# Patient Record
Sex: FEMALE | Race: WHITE | Employment: FULL TIME | URBAN - METROPOLITAN AREA
[De-identification: names, ages, dates, MRNs, and addresses within clinical notes are randomized per-mention and may not be internally consistent; named-entity substitution may affect disease eponyms.]

---

## 2021-07-29 ENCOUNTER — OFFICE VISIT (OUTPATIENT)
Dept: NEUROLOGY | Age: 31
End: 2021-07-29
Payer: MEDICAID

## 2021-07-29 VITALS
OXYGEN SATURATION: 95 % | SYSTOLIC BLOOD PRESSURE: 112 MMHG | TEMPERATURE: 98 F | HEART RATE: 82 BPM | DIASTOLIC BLOOD PRESSURE: 60 MMHG | WEIGHT: 174 LBS

## 2021-07-29 DIAGNOSIS — M54.81 BILATERAL OCCIPITAL NEURALGIA: ICD-10-CM

## 2021-07-29 DIAGNOSIS — G44.86 CERVICOGENIC HEADACHE: ICD-10-CM

## 2021-07-29 DIAGNOSIS — E23.6 PITUITARY CYST (HCC): Primary | ICD-10-CM

## 2021-07-29 PROCEDURE — 99205 OFFICE O/P NEW HI 60 MIN: CPT | Performed by: PSYCHIATRY & NEUROLOGY

## 2021-07-29 RX ORDER — DULOXETIN HYDROCHLORIDE 60 MG/1
60 CAPSULE, DELAYED RELEASE ORAL DAILY
COMMUNITY

## 2021-07-29 RX ORDER — CYCLOBENZAPRINE HCL 10 MG
10 TABLET ORAL
COMMUNITY
End: 2021-10-19 | Stop reason: SDUPTHER

## 2021-07-29 RX ORDER — CLONIDINE HYDROCHLORIDE 0.2 MG/1
0.2 TABLET ORAL DAILY
COMMUNITY
End: 2022-05-03

## 2021-07-29 NOTE — LETTER
7/29/2021    Patient: Liu Rod   YOB: 1990   Date of Visit: 7/29/2021     MD Marcos [address]  [id number] [address] [place] [id number]  Via Fax: [phone]    Dear Jess Rodriguez MD,      Thank you for referring Ms. Josh Watters to [facility] for evaluation. My notes for this consultation are attached. If you have questions, please do not hesitate to call me. I look forward to following your patient along with you.       Sincerely,    Myra Patel MD

## 2021-07-29 NOTE — PATIENT INSTRUCTIONS
Neck: Exercises  Introduction  Here are some examples of exercises for you to try. The exercises may be suggested for a condition or for rehabilitation. Start each exercise slowly. Ease off the exercises if you start to have pain. You will be told when to start these exercises and which ones will work best for you. How to do the exercises  Neck stretch   1. This stretch works best if you keep your shoulder down as you lean away from it. To help you remember to do this, start by relaxing your shoulders and lightly holding on to your thighs or your chair. 2. Tilt your head toward your shoulder and hold for 15 to 30 seconds. Let the weight of your head stretch your muscles. 3. If you would like a little added stretch, use your hand to gently and steadily pull your head toward your shoulder. For example, keeping your right shoulder down, lean your head to the left. 4. Repeat 2 to 4 times toward each shoulder. Diagonal neck stretch   1. Turn your head slightly toward the direction you will be stretching, and tilt your head diagonally toward your chest and hold for 15 to 30 seconds. 2. If you would like a little added stretch, use your hand to gently and steadily pull your head forward on the diagonal.  3. Repeat 2 to 4 times toward each side. Dorsal glide stretch   The dorsal glide stretches the back of the neck. If you feel pain, do not glide so far back. Some people find this exercise easier to do while lying on their backs with an ice pack on the neck. 1. Sit or stand tall and look straight ahead. 2. Slowly tuck your chin as you glide your head backward over your body  3. Hold for a count of 6, and then relax for up to 10 seconds. 4. Repeat 8 to 12 times. Chest and shoulder stretch   1. Sit or stand tall and glide your head backward as in the dorsal glide stretch. 2. Raise both arms so that your hands are next to your ears.   3. Take a deep breath, and as you breathe out, lower your elbows down and behind your back. You will feel your shoulder blades slide down and together, and at the same time you will feel a stretch across your chest and the front of your shoulders. 4. Hold for about 6 seconds, and then relax for up to 10 seconds. 5. Repeat 8 to 12 times. Strengthening: Hands on head   1. Move your head backward, forward, and side to side against gentle pressure from your hands, holding each position for about 6 seconds. 2. Repeat 8 to 12 times. Follow-up care is a key part of your treatment and safety. Be sure to make and go to all appointments, and call your doctor if you are having problems. It's also a good idea to know your test results and keep a list of the medicines you take. Where can you learn more? Go to http://www.barrios.com/  Enter P975 in the search box to learn more about \"Neck: Exercises. \"  Current as of: November 16, 2020               Content Version: 12.8  © 2006-2021 [employer]. Care instructions adapted under license by [employer] (which disclaims liability or warranty for this information). If you have questions about a medical condition or this instruction, always ask your healthcare professional. [place] [address] any warranty or liability for your use of this information. Shoulder Blade: Exercises  Introduction  Here are some examples of exercises for you to try. The exercises may be suggested for a condition or for rehabilitation. Start each exercise slowly. Ease off the exercises if you start to have pain. You will be told when to start these exercises and which ones will work best for you. How to do the exercises  Shoulder roll   1. Stand tall with your chin slightly tucked. Imagine that a string at the top of your head is pulling you straight up. 2. Keep your arms relaxed. All motion will be in your shoulders. 3. Shrug your shoulders up toward your ears, then up and back.  [place] your shoulders down and back, like you're sliding your hands down into your back pants pockets. 4. Repeat the circles at least 2 to 4 times. 5. This exercise is also helpful anytime you want to relax. Lower neck and upper back stretch   1. With your arms about shoulder height, clasp your hands in front of you. 2. Drop your chin toward your chest.  3. Reach straight forward so you are rounding your upper back. Think about pulling your shoulder blades apart. [place] feel a stretch across your upper back and shoulders. Hold for at least 6 seconds. 4. Repeat 2 to 4 times. Triceps stretch   1. Reach your arm straight up. 2. Keeping your elbow in place, bend your arm and reach your hand down behind your back. 3. With your other hand, apply gentle pressure to the bent elbow. [place] feel a stretch at the back of your upper arm and shoulder. Hold about 6 seconds. 4. Repeat 2 to 4 times with each arm. Shoulder stretch   1. Relax your shoulders. 2. Raise one arm to shoulder height, and reach it across your chest.  3. Pull the arm slightly toward you with your other arm. This will help you get a gentle stretch. Hold for about 6 seconds. 4. Repeat 2 to 4 times. Shoulder blade squeeze   1. Sit or stand up tall with your arms at your sides. 2. Keep your shoulders relaxed and down, not shrugged. 3. Squeeze your shoulder blades together. Hold for 6 seconds, then relax. 4. Repeat 8 to 12 times. Straight-arm shoulder blade squeeze   1. Sit or stand tall. Relax your shoulders. 2. With palms down, hold your elastic tubing or band straight out in front of you. 3. Start with slight tension in the tubing or band, with your hands about shoulder-width apart. 4. Slowly pull straight out to the sides, squeezing your shoulder blades together. Keep your arms straight and at shoulder height. Slowly release. 5. Repeat 8 to 12 times. Rowing   1. [place] your elastic tubing or band at about waist height.  Take one end in each hand. 2. Sit or stand with your feet hip-width apart. 3. Hold your arms straight in front of you. Adjust your distance to create slight tension in the tubing or band. 4. Slightly tuck your chin. Relax your shoulders. 5. Without shrugging your shoulders, pull straight back. Your elbows will pass alongside your waist.    Pull-downs   1. [place] your elastic tubing or band in the top of a closed door. Take one end in each hand. 2. Either sit or stand, depending on what is more comfortable. If you feel unsteady, sit on a chair. 3. Start with your arms up and comfortably apart, elbows straight. There should be a slight tension in the tubing or band. 4. Slightly tuck your chin, and look straight ahead. 5. Keeping your back straight, slowly pull down and back, bending your elbows. 6. Stop where your hands are level with your chin, in a \"goalpost\" position. 7. Repeat 8 to 12 times. Chest T stretch   1. Lie on your back. Raise your knees so they are bent. Plant your feet on the floor, hip-width apart. 2. Tuck your chin, and relax your shoulders. 3. Reach your arms straight out to the sides. If you don't feel a mild stretch in your shoulders and across your chest, use a foam roll or a tightly rolled blanket under your spine, from your tailbone to your head. 4. Relax in this position for at least 15 to 30 seconds while you breathe normally. Repeat 2 to 4 times. 5. As you get used to this stretch, keep adding a little more time until you are able relax in this position for 2 or 3 minutes. When you can relax for at least 2 minutes, you only need to do the exercise 1 time per session. Chest goalpost stretch   1. Lie on your back. Raise your knees so they are bent. Plant your feet on the floor, hip-width apart. 2. Tuck your chin, and relax your shoulders. 3. Reach your arms straight out to the sides. 4. Bend your arms at the elbows, with your hands pointed toward the top of your head.  Your arms should make an L on either side of your head. Your palms should be facing up. 5. If you don't feel a mild stretch in your shoulders and across your chest, use a foam roll or tightly rolled blanket under your spine, from your tailbone to your head. 6. Relax in this position for at least 15 to 30 seconds while you breathe normally. Repeat 2 to 4 times. 7. Each day you do this exercise, add a little more time until you can relax in this position for 2 or 3 minutes. When you can relax for at least 2 minutes, you only need to do the exercise 1 time per session. Follow-up care is a key part of your treatment and safety. Be sure to make and go to all appointments, and call your doctor if you are having problems. It's also a good idea to know your test results and keep a list of the medicines you take. Where can you learn more? Go to http://www.gray.com/  Enter H745 in the search box to learn more about \"Shoulder Blade: Exercises. \"  Current as of: November 16, 2020               Content Version: 12.8  © 2006-2021 [employer]. Care instructions adapted under license by [employer] (which disclaims liability or warranty for this information). If you have questions about a medical condition or this instruction, always ask your healthcare professional. [address] any warranty or liability for your use of this information.

## 2021-07-29 NOTE — PROGRESS NOTES
NEUROLOGY CLINIC NOTE    Patient ID:  Abundio Hoffman  [id number]  45 y.o.  1990    Date of Consultation:  July 29, 2021    Referring Physician: Dr. Raven Humphrey    Reason for Consultation:  Headaches    Chief Complaint   Patient presents with    Other     Cyst on brain seen after MRI    Headache     Temporal headaches at least once weekly        History of Present Illness: There are no problems to display for this patient. Past Medical History:   Diagnosis Date    Anxiety     Asthma     Depression     PTSD (post-traumatic stress disorder)      Past Surgical History:   Procedure Laterality Date    HX TUBAL LIGATION           Prior to Admission medications    Medication Sig Start Date End Date Taking? Authorizing Provider   cloNIDine HCL (CATAPRES) 0.2 mg tablet Take 0.2 mg by mouth daily. Yes Provider, Historical   DULoxetine (Cymbalta) 60 mg capsule Take 60 mg by mouth daily. Yes Provider, Historical   aspirin-acetaminophen-caffeine (EXCEDRIN ES) 250-250-65 mg per tablet Take 1 Tablet by mouth. Yes Provider, Historical   cyclobenzaprine (FLEXERIL) 10 mg tablet Take 10 mg by mouth three (3) times daily as needed for Muscle Spasm(s). Yes Provider, Historical     Social History     Tobacco Use    Smoking status: Never Smoker    Smokeless tobacco: Current User   Vaping Use    Vaping Use: Never assessed   Substance Use Topics    Alcohol use: Yes     Alcohol/week: 2.0 standard drinks     Types: 2 Glasses of wine per week    Drug use: Never     Family History   Problem Relation Age of Onset    COPD Mother     Arthritis Mother          Subjective:      Abundio Hoffman is a 27 y.o. RHWF anxiety, depression and PTSD who was referred here by Dr. Raven Humphrey for further evaluation of MRI findings of a brain cyst and headaches.     Headache for the past couple months  1 to 3 weeks with a headache and then another 3 weeks without  Sudden onset  Bitemporal and radiates to the back of the head  Throbbing  7-8/10 at its worst.  Associated with light sensitivity. Lasts for 1 hour. Excedrin sometimes it helps  No known precipitant. No worsening factors. Rest offers benefit. Last headache was this morning. Stay at home. Review of available records revealed:  Patient was seen by Dr. Kary Díaz, endocrinologist for hyperprolactinemia. Note mentions patient having issues with menorrhagia and galactorrhea. Prolactin level was noted to be elevated at 90 with normal TSH. Pregnancy test was negative. Menorrhagia since 10/20/2020. Also noted several months of galactorrhea. Patient was on risperidone 0.5 mg every day for 2 years for depression, anxiety and insomnia. Being followed by Dr. Bruce Quinn (psychiatrist). Labs done 6/2/2021 revealed elevated prolactin at 90 with normal LH, FSH, TSH and T4. Impression then was possibly risperidone related side effects versus problems with pituitary function. Advised to discuss with psychiatrist in changing risperidone to alternative medication and a brain MRI focusing on the pituitary was ordered. Additional laboratory work-up was ordered. Labs done 6/24/2021 revealed normal FSH, LH, CMP, TSH, free T4, cortisol, estradiol, IGF and elevated prolactin at 85.7. Brain MRI with and without contrast of the pituitary done 6/24/2021 on postcontrast study reveals a bandlike signal of hypoenhancement along the posterior aspect of the pituitary gland. 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 Discrete focus of hypoenhancement in the rightward aspect of the pituitary gland communicates with a pars intermedia and may represent a focal dilation of pars intermedius/pars intermedia cyst, less likely adenoma. No secondary features of mass-effect. Mild cerebellar tonsillar ectopia approximately 4 mm. There is no abnormal brain parenchymal or leptomeningeal enhancement.   Impression finding more related to pars intermedia/small cystic dilation and less likely a pituitary adenoma. Otherwise normal exam.    Patient was seen on follow-up 7/10/2021. Risperidone was discontinued by the patient for the past 2 weeks and she has lost 4 pounds since last appointment. Patient was referred to Dr. Duc Melgoza neurosurgery for pituitary lesion/cyst.  Repeat prolactin level testing was ordered. Per patient that has since improved. [facility] of [facility] revealed patient was seen by her primary care physician 5/12/2021. Patient was complaining of low blood pressure, menorrhagia and galactorrhea. Advised to monitor blood pressure at home. Complaining of neck and back pain and x-rays were ordered. Continue as needed albuterol for asthma. Continue medication for anxiety and PTSD. Laboratory work-up was ordered. And referred to OB. Labs done revealed unremarkable CMP, negative pregnancy screen, unremarkable UA, CBC, estradiol, LH, hemoglobin A1c, TSH and FSH. Elevated prolactin level at 64. Lipid panel revealed increased triglycerides. Outside reports reviewed: office notes, radiology reports, lab reports.     Review of Systems:    A comprehensive review of systems was performed:   Constitutional: positive for none  Eyes: positive for none  Ears, nose, mouth, throat, and face: positive for none  Respiratory: positive for none  Cardiovascular: positive for none  Gastrointestinal: positive for constipation   Genitourinary: positive for none  Integument/breast: positive for none  Hematologic/lymphatic: positive for none  Musculoskeletal: positive for none  Neurological: positive for headaches  Behavioral/Psych: positive for anxiety  Endocrine: positive for none  Allergic/Immunologic: positive for none      Objective:     Visit Vitals  /60 (BP 1 Location: Right arm, BP Patient Position: Sitting, BP Cuff Size: Large adult)   Pulse 82   Temp 98 °F (36.7 °C) (Temporal)   Wt 78.9 kg (174 lb)   SpO2 95%       PHYSICAL EXAM:    General Appearance: Alert, patient appears stated age. General:  Well developed, well nourished, patient in no apparent distress. Head/Face: The head is normocephalic and atraumatic. Eyes: Conjunctivae appear normal. Sclera appear normal and non-icteric. Nose (and Sinus):   No abnormality of the nose or sinuses is noted. Oral:   Throat is clear. Lymphatics:  No lymphadenopathy in the neck/head. Neck and Thyroid:   No bruits noted in the neck. Respiratory:  Lungs clear to auscultation. Cardiovascular:  Palpation and auscultation: regular rate and rhythm. Extremity: No joint swelling, erythema or pedal edema. NEUROLOGICAL EXAM:    Appearance: The patient is well developed, well nourished, provides a coherent history and is in no acute distress. Mental Status: Oriented to time, place and person. Fluent, no aphasia or dysarthria. Mood and affect appropriate. Cranial Nerves:   Intact visual fields. VA 20/20 OU on near vision without correction. Fundi are benign. ERICA, EOM's full, no nystagmus, no ptosis. Facial sensation is normal. Corneal reflexes are intact. Facial movement is symmetric. Hearing is normal bilaterally. Palate is midline with normal elevation. Sternocleidomastoid and trapezius muscles are normal. Tongue is midline. Motor:  5/5 strength in upper and lower proximal and distal muscles. Normal bulk and tone. No fasciculations. No pronator drift. Reflexes:   Deep tendon reflexes 1+/4 and symmetrical. Downgoing toes. Sensory:   Normal to cold, pinprick and vibration. Gait:  Normal gait. No Romberg. Can do tandem walking. Tremor:   No tremor noted. Cerebellar:  Intact FTN/NALDO/HTS. Neurovascular:  Normal heart sounds and regular rhythm, peripheral pulses intact, and no carotid bruits.      Anterior head posture with shoulders rotated in  (+) tenderness bilateral occiput    Imaging  Brain MRI: reviewed    Labs Reviewed      Assessment:   Pituitary cyst  Cervicogenic headaches  Occipital neuralgia    Plan: Neurological examination is nonfocal. Brain MRI with and without contrast of the pituitary done 6/24/2021 on postcontrast study reveals a bandlike signal of hypoenhancement along the posterior aspect of the pituitary gland. 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 Discrete focus of hypoenhancement in the rightward aspect of the pituitary gland communicates with a pars intermedia and may represent a focal dilation of pars intermedius/pars intermedia cyst, less likely adenoma. No secondary features of mass-effect. Mild cerebellar tonsillar ectopia approximately 4 mm. There is no abnormal brain parenchymal or leptomeningeal enhancement. Impression finding more related to pars intermedia/small cystic dilation and less likely a pituitary adenoma. Otherwise normal exam.  Pituitary laboratory work-up was unremarkable and prolactin levels back to normal reportedly. Findings on brain MRI is more coincidental.  No further work-up from a neurological standpoint. No indication currently to see neurosurgery. Certainly if laboratory future work-up becomes abnormal then a repeat brain MRI can be done for comparison. History exam reveals elements of cervicogenic headaches due to poor anterior head posture. Patient was advised to correct her anterior head posture. Use headrest at home and while driving. Use wireless headsets. Use only 1 pillow at most when sleeping. Do not carry anything on the shoulder. Patient was given brochure for neck and shoulder exercises to do. Okay to take OTC medication for abortive therapy. If condition persist patient may benefit from referral to physical therapy for more aggressive manipulation and dry needling. Potential trial of muscle relaxants. Exam also reveals elements of occipital neuralgia due to poor anterior head posture. Correction of head posture as above.   Potential trial of medications for neuralgia if persist.  Potential occipital nerve blocks if condition worsens. All questions and concerns were answered. Visit lasted 70 minutes. Greater than 50% was spent reviewing her medical records as summarized above including neuroimaging, discussion about her condition, etiology, prognosis, benign nature of the pituitary cyst, posture changes, neck and shoulder exercises, treatment options, medication    This note was created using voice recognition software. Despite editing, there may be syntax errors.

## 2021-08-18 ENCOUNTER — TELEPHONE (OUTPATIENT)
Dept: NEUROLOGY | Age: 31
End: 2021-08-18

## 2021-08-18 NOTE — TELEPHONE ENCOUNTER
----- Message from Candi Ziegler sent at 8/18/2021  9:30 AM EDT -----  Regarding: /telephone  General Message/Vendor Calls    Caller's first and last name:      Reason for call: The pt would like to know if her MRI images of her cyst were normal.      Callback required yes/no and why:Yes.       Best contact number(s):[phone]

## 2021-10-19 ENCOUNTER — OFFICE VISIT (OUTPATIENT)
Dept: NEUROLOGY | Age: 31
End: 2021-10-19
Payer: MEDICAID

## 2021-10-19 VITALS — RESPIRATION RATE: 20 BRPM

## 2021-10-19 DIAGNOSIS — E23.6 PITUITARY CYST (HCC): ICD-10-CM

## 2021-10-19 DIAGNOSIS — G44.86 CERVICOGENIC HEADACHE: Primary | ICD-10-CM

## 2021-10-19 DIAGNOSIS — M54.81 BILATERAL OCCIPITAL NEURALGIA: ICD-10-CM

## 2021-10-19 PROCEDURE — 99214 OFFICE O/P EST MOD 30 MIN: CPT | Performed by: PSYCHIATRY & NEUROLOGY

## 2021-10-19 RX ORDER — ARIPIPRAZOLE 2 MG/1
TABLET ORAL
COMMUNITY
Start: 2021-09-24

## 2021-10-19 RX ORDER — CYCLOBENZAPRINE HCL 10 MG
TABLET ORAL
Qty: 60 TABLET | Refills: 2 | Status: SHIPPED | OUTPATIENT
Start: 2021-10-19

## 2021-10-19 NOTE — PROGRESS NOTES
NEUROLOGY CLINIC NOTE    Patient ID:  Narciso Levy  [id number]  33 y.o.  1990    Date of Visit:  October 19, 2021    Referring Physician: Dr. Cecil Ferguson    Reason for Visit:  Headaches    Chief Complaint   Patient presents with    Follow-up     headaches, pituitary cyst        History of Present Illness: There are no problems to display for this patient. Past Medical History:   Diagnosis Date    Anxiety     Asthma     Depression     PTSD (post-traumatic stress disorder)      Past Surgical History:   Procedure Laterality Date    HX TUBAL LIGATION           Prior to Admission medications    Medication Sig Start Date End Date Taking? Authorizing Provider   ARIPiprazole (ABILIFY) 2 mg tablet  9/24/21  Yes Provider, Historical   cloNIDine HCL (CATAPRES) 0.2 mg tablet Take 0.2 mg by mouth daily. Yes Provider, Historical   DULoxetine (Cymbalta) 60 mg capsule Take 60 mg by mouth daily. Yes Provider, Historical   aspirin-acetaminophen-caffeine (EXCEDRIN ES) 250-250-65 mg per tablet Take 1 Tablet by mouth. Yes Provider, Historical   cyclobenzaprine (FLEXERIL) 10 mg tablet Take 10 mg by mouth three (3) times daily as needed for Muscle Spasm(s). Yes Provider, Historical     Social History     Tobacco Use    Smoking status: Never Smoker    Smokeless tobacco: Current User   Vaping Use    Vaping Use: Never assessed   Substance Use Topics    Alcohol use: Yes     Alcohol/week: 2.0 standard drinks     Types: 2 Glasses of wine per week    Drug use: Never     Family History   Problem Relation Age of Onset    COPD Mother     Arthritis Mother          Subjective:      Narciso Levy is a 32 y.o. RHWF anxiety, depression and PTSD who was referred here by Dr. Cecil Ferguson for further evaluation of MRI findings of a brain cyst and headaches. Patient is here for follow up.     Headache for the past couple months  1 to 3 weeks with a headache and then another 3 weeks without  Sudden onset  Bitemporal and radiates to the back of the head  Throbbing  7-8/10 at its worst.  Associated with light sensitivity. Lasts for 1 hour. Excedrin sometimes it helps  No known precipitant. No worsening factors. Rest offers benefit. Last headache was this morning. Stay at home. Review of available records revealed:  Patient was seen by Dr. Riccardo Burden, endocrinologist for hyperprolactinemia. Note mentions patient having issues with menorrhagia and galactorrhea. Prolactin level was noted to be elevated at 90 with normal TSH. Pregnancy test was negative. Menorrhagia since 10/20/2020. Also noted several months of galactorrhea. Patient was on risperidone 0.5 mg every day for 2 years for depression, anxiety and insomnia. Being followed by Dr. Delfin Bloom (psychiatrist). Labs done 6/2/2021 revealed elevated prolactin at 90 with normal LH, FSH, TSH and T4. Impression then was possibly risperidone related side effects versus problems with pituitary function. Advised to discuss with psychiatrist in changing risperidone to alternative medication and a brain MRI focusing on the pituitary was ordered. Additional laboratory work-up was ordered. Labs done 6/24/2021 revealed normal FSH, LH, CMP, TSH, free T4, cortisol, estradiol, IGF and elevated prolactin at 85.7. Brain MRI with and without contrast of the pituitary done 6/24/2021 on postcontrast study reveals a bandlike signal of hypoenhancement along the posterior aspect of the pituitary gland. 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 Discrete focus of hypoenhancement in the rightward aspect of the pituitary gland communicates with a pars intermedia and may represent a focal dilation of pars intermedius/pars intermedia cyst, less likely adenoma. No secondary features of mass-effect. Mild cerebellar tonsillar ectopia approximately 4 mm. There is no abnormal brain parenchymal or leptomeningeal enhancement.   Impression finding more related to pars intermedia/small cystic dilation and less likely a pituitary adenoma. Otherwise normal exam.    Patient was seen on follow-up 7/10/2021. Risperidone was discontinued by the patient for the past 2 weeks and she has lost 4 pounds since last appointment. Patient was referred to Dr. Aaliyah Manning neurosurgery for pituitary lesion/cyst.  Repeat prolactin level testing was ordered. Per patient that has since improved. Review of records from [address] [place] [address] revealed patient was seen by her primary care physician 5/12/2021. Patient was complaining of low blood pressure, menorrhagia and galactorrhea. Advised to monitor blood pressure at home. Complaining of neck and back pain and x-rays were ordered. Continue as needed albuterol for asthma. Continue medication for anxiety and PTSD. Laboratory work-up was ordered. And referred to OB. Labs done revealed unremarkable CMP, negative pregnancy screen, unremarkable UA, CBC, estradiol, LH, hemoglobin A1c, TSH and FSH. Elevated prolactin level at 64. Lipid panel revealed increased triglycerides. Since the last visit on 7/29/2021, patient reports headaches have improved. Less frequent. 1-3 headaches per week 8/10 in intensity. Excedrin helps better than tylenol or ibuprofen. Last bad headache was today. Bitemporal, behind the eyes and occiput in location.     Outside reports reviewed: none    Review of Systems:    A comprehensive review of systems was performed:   Constitutional: positive for none  Eyes: positive for none  Ears, nose, mouth, throat, and face: positive for none  Respiratory: positive for none  Cardiovascular: positive for none  Gastrointestinal: positive for constipation   Genitourinary: positive for none  Integument/breast: positive for none  Hematologic/lymphatic: positive for none  Musculoskeletal: positive for none  Neurological: positive for headaches  Behavioral/Psych: positive for anxiety  Endocrine: positive for none  Allergic/Immunologic: positive for none      Objective:     Visit Vitals  Resp 20       PHYSICAL EXAM:    NEUROLOGICAL EXAM:    Appearance: The patient is well developed, well nourished, provides a coherent history and is in no acute distress. Mental Status: Oriented to time, place and person. Fluent, no aphasia or dysarthria. Mood and affect appropriate. Cranial Nerves:   II - XII were intact. Motor:  5/5 strength in upper and lower proximal and distal muscles. Normal bulk and tone. No pronator drift. Reflexes:   Deep tendon reflexes were symmetrical.    Sensory:   Normal to cold, pinprick and vibration. Gait:  Normal gait. No Romberg. Tremor:   No tremor noted. Cerebellar:  Intact FTN/NALDO/HTS. Anterior head posture with shoulders rotated in      Assessment:   Pituitary cyst  Cervicogenic headaches  Occipital neuralgia    Plan:   Neurological examination is nonfocal. Brain MRI with and without contrast of the pituitary done 6/24/2021 on postcontrast study reveals a bandlike signal of hypoenhancement along the posterior aspect of the pituitary gland. 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 Discrete focus of hypoenhancement in the rightward aspect of the pituitary gland communicates with a pars intermedia and may represent a focal dilation of pars intermedius/pars intermedia cyst, less likely adenoma. No secondary features of mass-effect. Mild cerebellar tonsillar ectopia approximately 4 mm. There is no abnormal brain parenchymal or leptomeningeal enhancement. Impression finding more related to pars intermedia/small cystic dilation and less likely a pituitary adenoma. Otherwise normal exam.  Pituitary laboratory work-up was unremarkable and prolactin levels back to normal reportedly. Findings on brain MRI is more coincidental.  No further work-up from a neurological standpoint. No indication currently to see neurosurgery.   Certainly if laboratory future work-up becomes abnormal then a repeat brain MRI can be done for comparison. History exam reveals elements of cervicogenic headaches due to poor anterior head posture. Patient was encouraged to continue to correct her anterior head posture. Use headrest at home and while driving. Use wireless headsets. Use only 1 pillow at most when sleeping. Do not carry anything on the shoulder. Continue neck and shoulder exercises. Okay to take OTC medication for abortive therapy. If condition persist patient may benefit from referral to physical therapy for more aggressive manipulation and dry needling. Trial of Cyclobenzaprine 10 mg daily and BID if necessary. Prescription was provided. Exam also reveals elements of occipital neuralgia due to poor anterior head posture. Correction of head posture as above. Potential trial of medications for neuralgia if persist.  Potential occipital nerve blocks if condition worsens. All questions and concerns were answered. This note was created using voice recognition software. Despite editing, there may be syntax errors.

## 2021-10-19 NOTE — LETTER
10/22/2021    Patient: Anita Elkins   YOB: 1990   Date of Visit: 10/19/2021     [address]  [id number] [phone]  Via Fax: [phone]    Dear Deborah Scruggs MD,      Thank you for referring Ms. Mahamed Camarillo to [facility] for evaluation. My notes for this consultation are attached. If you have questions, please do not hesitate to call me. I look forward to following your patient along with you.       Sincerely,    Fred Kaba MD

## 2022-01-22 ENCOUNTER — HOSPITAL ENCOUNTER (EMERGENCY)
Age: 32
Discharge: HOME | End: 2022-01-22
Payer: COMMERCIAL

## 2022-01-22 VITALS
TEMPERATURE: 97.8 F | OXYGEN SATURATION: 98 % | WEIGHT: 150 LBS | HEIGHT: 67 IN | SYSTOLIC BLOOD PRESSURE: 100 MMHG | BODY MASS INDEX: 23.54 KG/M2 | RESPIRATION RATE: 18 BRPM | HEART RATE: 72 BPM | DIASTOLIC BLOOD PRESSURE: 45 MMHG

## 2022-01-22 DIAGNOSIS — D64.9 ANEMIA, UNSPECIFIED TYPE: ICD-10-CM

## 2022-01-22 DIAGNOSIS — N93.8 DUB (DYSFUNCTIONAL UTERINE BLEEDING): Primary | ICD-10-CM

## 2022-01-22 LAB
ABO + RH BLD: NORMAL
ALBUMIN SERPL-MCNC: 2 G/DL (ref 3.5–5)
ALBUMIN SERPL-MCNC: 3.2 G/DL (ref 3.5–5)
ALBUMIN/GLOB SERPL: 0.8 {RATIO} (ref 1.1–2.2)
ALBUMIN/GLOB SERPL: 1 {RATIO} (ref 1.1–2.2)
ALP SERPL-CCNC: 29 U/L (ref 45–117)
ALP SERPL-CCNC: 44 U/L (ref 45–117)
ALT SERPL-CCNC: 15 U/L (ref 12–78)
ALT SERPL-CCNC: 22 U/L (ref 12–78)
ANION GAP SERPL CALC-SCNC: 1 MMOL/L (ref 5–15)
ANION GAP SERPL CALC-SCNC: 3 MMOL/L (ref 5–15)
AST SERPL W P-5'-P-CCNC: 16 U/L (ref 15–37)
AST SERPL W P-5'-P-CCNC: 17 U/L (ref 15–37)
BASOPHILS # BLD: 0.1 K/UL (ref 0–0.1)
BASOPHILS NFR BLD: 1 % (ref 0–1)
BILIRUB SERPL-MCNC: 0.2 MG/DL (ref 0.2–1)
BILIRUB SERPL-MCNC: 0.3 MG/DL (ref 0.2–1)
BLOOD GROUP ANTIBODIES SERPL: NEGATIVE
BUN SERPL-MCNC: 11 MG/DL (ref 6–20)
BUN SERPL-MCNC: 8 MG/DL (ref 6–20)
BUN/CREAT SERPL: 14 (ref 12–20)
BUN/CREAT SERPL: 19 (ref 12–20)
CA-I BLD-MCNC: 5.9 MG/DL (ref 8.5–10.1)
CA-I BLD-MCNC: 8.4 MG/DL (ref 8.5–10.1)
CHLORIDE SERPL-SCNC: 112 MMOL/L (ref 97–108)
CHLORIDE SERPL-SCNC: 121 MMOL/L (ref 97–108)
CO2 SERPL-SCNC: 20 MMOL/L (ref 21–32)
CO2 SERPL-SCNC: 28 MMOL/L (ref 21–32)
CREAT SERPL-MCNC: 0.42 MG/DL (ref 0.55–1.02)
CREAT SERPL-MCNC: 0.77 MG/DL (ref 0.55–1.02)
DIFFERENTIAL METHOD BLD: ABNORMAL
EOSINOPHIL # BLD: 0.2 K/UL (ref 0–0.4)
EOSINOPHIL NFR BLD: 3 % (ref 0–7)
ERYTHROCYTE [DISTWIDTH] IN BLOOD BY AUTOMATED COUNT: 13.3 % (ref 11.5–14.5)
GLOBULIN SER CALC-MCNC: 2.5 G/DL (ref 2–4)
GLOBULIN SER CALC-MCNC: 3.3 G/DL (ref 2–4)
GLUCOSE SERPL-MCNC: 66 MG/DL (ref 65–100)
GLUCOSE SERPL-MCNC: 92 MG/DL (ref 65–100)
HCG SERPL QL: NEGATIVE
HCT VFR BLD AUTO: 33.6 % (ref 35–47)
HGB BLD-MCNC: 10.7 G/DL (ref 11.5–16)
IMM GRANULOCYTES # BLD AUTO: 0 K/UL (ref 0–0.04)
IMM GRANULOCYTES NFR BLD AUTO: 0 % (ref 0–0.5)
LYMPHOCYTES # BLD: 1.5 K/UL (ref 0.8–3.5)
LYMPHOCYTES NFR BLD: 21 % (ref 12–49)
MCH RBC QN AUTO: 31.2 PG (ref 26–34)
MCHC RBC AUTO-ENTMCNC: 31.8 G/DL (ref 30–36.5)
MCV RBC AUTO: 98 FL (ref 80–99)
MONOCYTES # BLD: 0.5 K/UL (ref 0–1)
MONOCYTES NFR BLD: 7 % (ref 5–13)
NEUTS SEG # BLD: 4.8 K/UL (ref 1.8–8)
NEUTS SEG NFR BLD: 68 % (ref 32–75)
NRBC # BLD: 0 K/UL (ref 0–0.01)
NRBC BLD-RTO: 0 PER 100 WBC
PLATELET # BLD AUTO: 224 K/UL (ref 150–400)
PMV BLD AUTO: 11.4 FL (ref 8.9–12.9)
POTASSIUM SERPL-SCNC: 3.7 MMOL/L (ref 3.5–5.1)
POTASSIUM SERPL-SCNC: 4 MMOL/L (ref 3.5–5.1)
PROT SERPL-MCNC: 4.5 G/DL (ref 6.4–8.2)
PROT SERPL-MCNC: 6.5 G/DL (ref 6.4–8.2)
RBC # BLD AUTO: 3.43 M/UL (ref 3.8–5.2)
SODIUM SERPL-SCNC: 141 MMOL/L (ref 136–145)
SODIUM SERPL-SCNC: 144 MMOL/L (ref 136–145)
SPECIMEN EXP DATE BLD: NORMAL
WBC # BLD AUTO: 7 K/UL (ref 3.6–11)

## 2022-01-22 PROCEDURE — 86900 BLOOD TYPING SEROLOGIC ABO: CPT

## 2022-01-22 PROCEDURE — 80053 COMPREHEN METABOLIC PANEL: CPT

## 2022-01-22 PROCEDURE — 85025 COMPLETE CBC W/AUTO DIFF WBC: CPT

## 2022-01-22 PROCEDURE — 84703 CHORIONIC GONADOTROPIN ASSAY: CPT

## 2022-01-22 PROCEDURE — 99283 EMERGENCY DEPT VISIT LOW MDM: CPT

## 2022-01-22 PROCEDURE — 36415 COLL VENOUS BLD VENIPUNCTURE: CPT

## 2022-01-22 NOTE — DISCHARGE INSTRUCTIONS
Thank you! Thank you for allowing me to care for you in the emergency department. I sincerely hope that you are satisfied with your visit today. It is my goal to provide you with excellent care. Below you will find a list of your labs and imaging from your visit today. Should you have any questions regarding these results please do not hesitate to call the emergency department. Labs -     Recent Results (from the past 12 hour(s))   CBC WITH AUTOMATED DIFF    Collection Time: 01/22/22 11:26 AM   Result Value Ref Range    WBC 7.0 3.6 - 11.0 K/uL    RBC 3.43 (L) 3.80 - 5.20 M/uL    HGB 10.7 (L) 11.5 - 16.0 g/dL    HCT 33.6 (L) 35.0 - 47.0 %    MCV 98.0 80.0 - 99.0 FL    MCH 31.2 26.0 - 34.0 PG    MCHC 31.8 30.0 - 36.5 g/dL    RDW 13.3 11.5 - 14.5 %    PLATELET 338 516 - 933 K/uL    MPV 11.4 8.9 - 12.9 FL    NRBC 0.0 0.0  WBC    ABSOLUTE NRBC 0.00 0.00 - 0.01 K/uL    NEUTROPHILS 68 32 - 75 %    LYMPHOCYTES 21 12 - 49 %    MONOCYTES 7 5 - 13 %    EOSINOPHILS 3 0 - 7 %    BASOPHILS 1 0 - 1 %    IMMATURE GRANULOCYTES 0 0 - 0.5 %    ABS. NEUTROPHILS 4.8 1.8 - 8.0 K/UL    ABS. LYMPHOCYTES 1.5 0.8 - 3.5 K/UL    ABS. MONOCYTES 0.5 0.0 - 1.0 K/UL    ABS. EOSINOPHILS 0.2 0.0 - 0.4 K/UL    ABS. BASOPHILS 0.1 0.0 - 0.1 K/UL    ABS. IMM.  GRANS. 0.0 0.00 - 0.04 K/UL    DF AUTOMATED     TYPE & SCREEN    Collection Time: 01/22/22 11:26 AM   Result Value Ref Range    Crossmatch Expiration 01/25/2022,2353     ABO/Rh(D) A Positive     Antibody screen Negative    METABOLIC PANEL, COMPREHENSIVE    Collection Time: 01/22/22 11:26 AM   Result Value Ref Range    Sodium 144 136 - 145 mmol/L    Potassium 3.7 3.5 - 5.1 mmol/L    Chloride 121 (H) 97 - 108 mmol/L    CO2 20 (L) 21 - 32 mmol/L    Anion gap 3 (L) 5 - 15 mmol/L    Glucose 66 65 - 100 mg/dL    BUN 8 6 - 20 mg/dL    Creatinine 0.42 (L) 0.55 - 1.02 mg/dL    BUN/Creatinine ratio 19 12 - 20      GFR est AA >60 >60 ml/min/1.73m2    GFR est non-AA >60 >60 ml/min/1.73m2 Calcium 5.9 (LL) 8.5 - 10.1 mg/dL    Bilirubin, total 0.2 0.2 - 1.0 mg/dL    AST (SGOT) 17 15 - 37 U/L    ALT (SGPT) 15 12 - 78 U/L    Alk. phosphatase 29 (L) 45 - 117 U/L    Protein, total 4.5 (L) 6.4 - 8.2 g/dL    Albumin 2.0 (L) 3.5 - 5.0 g/dL    Globulin 2.5 2.0 - 4.0 g/dL    A-G Ratio 0.8 (L) 1.1 - 2.2     HCG QL SERUM    Collection Time: 01/22/22 11:26 AM   Result Value Ref Range    HCG, Ql. Negative Negative     METABOLIC PANEL, COMPREHENSIVE    Collection Time: 01/22/22  1:28 PM   Result Value Ref Range    Sodium 141 136 - 145 mmol/L    Potassium 4.0 3.5 - 5.1 mmol/L    Chloride 112 (H) 97 - 108 mmol/L    CO2 28 21 - 32 mmol/L    Anion gap 1 (L) 5 - 15 mmol/L    Glucose 92 65 - 100 mg/dL    BUN 11 6 - 20 mg/dL    Creatinine 0.77 0.55 - 1.02 mg/dL    BUN/Creatinine ratio 14 12 - 20      GFR est AA >60 >60 ml/min/1.73m2    GFR est non-AA >60 >60 ml/min/1.73m2    Calcium 8.4 (L) 8.5 - 10.1 mg/dL    Bilirubin, total 0.3 0.2 - 1.0 mg/dL    AST (SGOT) 16 15 - 37 U/L    ALT (SGPT) 22 12 - 78 U/L    Alk. phosphatase 44 (L) 45 - 117 U/L    Protein, total 6.5 6.4 - 8.2 g/dL    Albumin 3.2 (L) 3.5 - 5.0 g/dL    Globulin 3.3 2.0 - 4.0 g/dL    A-G Ratio 1.0 (L) 1.1 - 2.2         Radiologic Studies -   No orders to display     CT Results  (Last 48 hours)      None          CXR Results  (Last 48 hours)      None               If you feel that you have not received excellent quality care or timely care, please ask to speak to the nurse manager. Please choose us in the future for your continued health care needs. ------------------------------------------------------------------------------------------------------------  The exam and treatment you received in the Emergency Department were for an urgent problem and are not intended as complete care.  It is important that you follow-up with a doctor, nurse practitioner, or physician assistant to:  (1) confirm your diagnosis,  (2) re-evaluation of changes in your illness and treatment, and  (3) for ongoing care. If your symptoms become worse or you do not improve as expected and you are unable to reach your usual health care provider, you should return to the Emergency Department. We are available 24 hours a day. Please take your discharge instructions with you when you go to your follow-up appointment. If you have any problem arranging a follow-up appointment, contact the Emergency Department immediately. If a prescription has been provided, please have it filled as soon as possible to prevent a delay in treatment. Read the entire medication instruction sheet provided to you by the pharmacy. If you have any questions or reservations about taking the medication due to side effects or interactions with other medications, please call your primary care physician or contact the ER to speak with the charge nurse. Make an appointment with your family doctor or the physician you were referred to for follow-up of this visit as instructed on your discharge paperwork, as this is a mandatory follow-up. Return to the ER if you are unable to be seen or if you are unable to be seen in a timely manner. If you have any problem arranging the follow-up visit, contact the Emergency Department immediately.

## 2022-01-22 NOTE — ED PROVIDER NOTES
EMERGENCY DEPARTMENT HISTORY AND PHYSICAL EXAM      Date: 1/22/2022  Patient Name: Oziel Nichols    History of Presenting Illness     Chief Complaint   Patient presents with    Vaginal Bleeding       History Provided By: Patient    HPI: Oziel Nichols, 32 y.o. female with a past medical history significant for anxiety, PTSD, asthma, depression who presents to the ED with cc of gradually worsening vaginal bleeding x9 days. Associated symptoms include lower abd cramping. No particular alleviating or exacerbating factors. States that her menstrual cycles usually last 5 to 7 days and she changes her pads every 4 hours. States that over the last 9 days her cycle has been heavier than usual with passage of a clot that may have looked like products of conception. States she has been changing her pad every 2 hours over the last 2 days. Unsure if she is pregnant. Does follow with GYN. Patient denies fever, chills, chest pain, shortness of breath, nausea, vomiting, diarrhea, urinary symptoms, abnormal vaginal d/c    There are no other complaints, changes, or physical findings at this time. PCP: Candy Perera MD    No current facility-administered medications on file prior to encounter. Current Outpatient Medications on File Prior to Encounter   Medication Sig Dispense Refill    ARIPiprazole (ABILIFY) 2 mg tablet       cyclobenzaprine (FLEXERIL) 10 mg tablet Take 1 at bedtime x 1 week; then 1 BID 60 Tablet 2    cloNIDine HCL (CATAPRES) 0.2 mg tablet Take 0.2 mg by mouth daily.  DULoxetine (Cymbalta) 60 mg capsule Take 60 mg by mouth daily.  aspirin-acetaminophen-caffeine (EXCEDRIN ES) 250-250-65 mg per tablet Take 1 Tablet by mouth.          Past History     Past Medical History:  Past Medical History:   Diagnosis Date    Anxiety     Asthma     Depression     PTSD (post-traumatic stress disorder)        Past Surgical History:  Past Surgical History:   Procedure Laterality Date    HX TUBAL LIGATION         Family History:  Family History   Problem Relation Age of Onset    COPD Mother     Arthritis Mother        Social History:  Social History     Tobacco Use    Smoking status: Never Smoker    Smokeless tobacco: Current User   Vaping Use    Vaping Use: Not on file   Substance Use Topics    Alcohol use: Yes     Alcohol/week: 2.0 standard drinks     Types: 2 Glasses of wine per week    Drug use: Never       Allergies:  No Known Allergies      Review of Systems     Review of Systems   Constitutional: Negative for chills, fatigue and fever. HENT: Negative. Respiratory: Negative for cough, chest tightness, shortness of breath and wheezing. Cardiovascular: Negative for chest pain and palpitations. Gastrointestinal: Positive for abdominal pain. Negative for blood in stool, diarrhea, nausea and vomiting. Genitourinary: Positive for vaginal bleeding. Negative for decreased urine volume, difficulty urinating, dysuria, enuresis, flank pain, frequency, genital sores, hematuria and urgency. Musculoskeletal: Negative for back pain, neck pain and neck stiffness. Skin: Negative for rash. Neurological: Negative for dizziness, weakness, light-headedness and headaches. Psychiatric/Behavioral: Negative. All other systems reviewed and are negative. Physical Exam     Physical Exam  Vitals and nursing note reviewed. Constitutional:       General: She is not in acute distress. Appearance: Normal appearance. She is well-developed. She is not ill-appearing, toxic-appearing or diaphoretic. HENT:      Head: Normocephalic and atraumatic. Nose: Nose normal. No congestion or rhinorrhea. Mouth/Throat:      Mouth: Mucous membranes are moist.      Pharynx: Oropharynx is clear. No oropharyngeal exudate or posterior oropharyngeal erythema. Eyes:      General: No scleral icterus. Conjunctiva/sclera: Conjunctivae normal.      Pupils: Pupils are equal, round, and reactive to light. Cardiovascular:      Rate and Rhythm: Normal rate and regular rhythm. Pulses:           Radial pulses are 2+ on the right side and 2+ on the left side. Dorsalis pedis pulses are 2+ on the right side and 2+ on the left side. Heart sounds: No murmur heard. No friction rub. No gallop. Pulmonary:      Effort: Pulmonary effort is normal. No tachypnea, accessory muscle usage, respiratory distress or retractions. Breath sounds: Normal breath sounds. No stridor. No decreased breath sounds, wheezing, rhonchi or rales. Chest:      Chest wall: No tenderness. Abdominal:      General: Bowel sounds are normal. There is no distension. Palpations: Abdomen is soft. There is no mass. Tenderness: There is no abdominal tenderness. There is no right CVA tenderness, left CVA tenderness, guarding or rebound. Genitourinary:     Comments: Patient deferred  Musculoskeletal:         General: No deformity. Normal range of motion. Cervical back: Normal range of motion and neck supple. No rigidity. No muscular tenderness. Right lower leg: No edema. Left lower leg: No edema. Skin:     General: Skin is warm and dry. Capillary Refill: Capillary refill takes less than 2 seconds. Coloration: Skin is not jaundiced or pale. Findings: No bruising, erythema or rash. Neurological:      General: No focal deficit present. Mental Status: She is alert and oriented to person, place, and time. Mental status is at baseline. Sensory: Sensation is intact. Motor: Motor function is intact. Psychiatric:         Mood and Affect: Mood normal.         Behavior: Behavior normal. Behavior is cooperative. Thought Content:  Thought content normal.         Judgment: Judgment normal.         Lab and Diagnostic Study Results     Labs -  Recent Results (from the past 24 hour(s))   CBC WITH AUTOMATED DIFF    Collection Time: 01/22/22 11:26 AM   Result Value Ref Range    WBC 7.0 3.6 - 11.0 K/uL    RBC 3.43 (L) 3.80 - 5.20 M/uL    HGB 10.7 (L) 11.5 - 16.0 g/dL    HCT 33.6 (L) 35.0 - 47.0 %    MCV 98.0 80.0 - 99.0 FL    MCH 31.2 26.0 - 34.0 PG    MCHC 31.8 30.0 - 36.5 g/dL    RDW 13.3 11.5 - 14.5 %    PLATELET 299 366 - 444 K/uL    MPV 11.4 8.9 - 12.9 FL    NRBC 0.0 0.0  WBC    ABSOLUTE NRBC 0.00 0.00 - 0.01 K/uL    NEUTROPHILS 68 32 - 75 %    LYMPHOCYTES 21 12 - 49 %    MONOCYTES 7 5 - 13 %    EOSINOPHILS 3 0 - 7 %    BASOPHILS 1 0 - 1 %    IMMATURE GRANULOCYTES 0 0 - 0.5 %    ABS. NEUTROPHILS 4.8 1.8 - 8.0 K/UL    ABS. LYMPHOCYTES 1.5 0.8 - 3.5 K/UL    ABS. MONOCYTES 0.5 0.0 - 1.0 K/UL    ABS. EOSINOPHILS 0.2 0.0 - 0.4 K/UL    ABS. BASOPHILS 0.1 0.0 - 0.1 K/UL    ABS. IMM. GRANS. 0.0 0.00 - 0.04 K/UL    DF AUTOMATED     TYPE & SCREEN    Collection Time: 01/22/22 11:26 AM   Result Value Ref Range    Crossmatch Expiration 01/25/2022,2359     ABO/Rh(D) A Positive     Antibody screen Negative    METABOLIC PANEL, COMPREHENSIVE    Collection Time: 01/22/22 11:26 AM   Result Value Ref Range    Sodium 144 136 - 145 mmol/L    Potassium 3.7 3.5 - 5.1 mmol/L    Chloride 121 (H) 97 - 108 mmol/L    CO2 20 (L) 21 - 32 mmol/L    Anion gap 3 (L) 5 - 15 mmol/L    Glucose 66 65 - 100 mg/dL    BUN 8 6 - 20 mg/dL    Creatinine 0.42 (L) 0.55 - 1.02 mg/dL    BUN/Creatinine ratio 19 12 - 20      GFR est AA >60 >60 ml/min/1.73m2    GFR est non-AA >60 >60 ml/min/1.73m2    Calcium 5.9 (LL) 8.5 - 10.1 mg/dL    Bilirubin, total 0.2 0.2 - 1.0 mg/dL    AST (SGOT) 17 15 - 37 U/L    ALT (SGPT) 15 12 - 78 U/L    Alk.  phosphatase 29 (L) 45 - 117 U/L    Protein, total 4.5 (L) 6.4 - 8.2 g/dL    Albumin 2.0 (L) 3.5 - 5.0 g/dL    Globulin 2.5 2.0 - 4.0 g/dL    A-G Ratio 0.8 (L) 1.1 - 2.2     HCG QL SERUM    Collection Time: 01/22/22 11:26 AM   Result Value Ref Range    HCG, Ql. Negative Negative     METABOLIC PANEL, COMPREHENSIVE    Collection Time: 01/22/22  1:28 PM   Result Value Ref Range    Sodium 141 136 - 145 mmol/L    Potassium 4.0 3.5 - 5.1 mmol/L    Chloride 112 (H) 97 - 108 mmol/L    CO2 28 21 - 32 mmol/L    Anion gap 1 (L) 5 - 15 mmol/L    Glucose 92 65 - 100 mg/dL    BUN 11 6 - 20 mg/dL    Creatinine 0.77 0.55 - 1.02 mg/dL    BUN/Creatinine ratio 14 12 - 20      GFR est AA >60 >60 ml/min/1.73m2    GFR est non-AA >60 >60 ml/min/1.73m2    Calcium 8.4 (L) 8.5 - 10.1 mg/dL    Bilirubin, total 0.3 0.2 - 1.0 mg/dL    AST (SGOT) 16 15 - 37 U/L    ALT (SGPT) 22 12 - 78 U/L    Alk. phosphatase 44 (L) 45 - 117 U/L    Protein, total 6.5 6.4 - 8.2 g/dL    Albumin 3.2 (L) 3.5 - 5.0 g/dL    Globulin 3.3 2.0 - 4.0 g/dL    A-G Ratio 1.0 (L) 1.1 - 2.2         Radiologic Studies -   No orders to display       Medical Decision Making   - I am the first provider for this patient. - I reviewed the vital signs, available nursing notes, past medical history, past surgical history, family history and social history. - Initial assessment performed. The patients presenting problems have been discussed, and they are in agreement with the care plan formulated and outlined with them. I have encouraged them to ask questions as they arise throughout their visit. Vital Signs-Reviewed the patient's vital signs.   Patient Vitals for the past 24 hrs:   Temp Pulse Resp BP SpO2   01/22/22 1054 97.8 °F (36.6 °C) 72 18 (!) 100/45 98 %       Records Reviewed: Nursing Notes and Old Medical Records    The patient presents with vaginal bleeding with a differential diagnosis of DUB, pregnancy, miscarriage, acute anemia      ED Course:     ED Course as of 01/22/22 1540   Sat Jan 22, 2022   1321 HGB(!): 10.7  No previous for comparison [NO]   1322 Calcium(!!): 5.9  Will recheck CMP [NO]   1416 Calcium(!): 8.4 [NO]      ED Course User Index  [NO] Rice-Govind Pastrana PA       Orders Placed This Encounter    CBC WITH AUTOMATED DIFF     Standing Status:   Standing     Number of Occurrences:   1    COMPREHENSIVE METABOLIC PANEL     Standing Status: Standing     Number of Occurrences:   1    HCG QL SERUM     Standing Status:   Standing     Number of Occurrences:   1    METABOLIC PANEL, COMPREHENSIVE     Standing Status:   Standing     Number of Occurrences:   1    TYPE & SCREEN     ENTER SURGERY DATE IF FOR PRE-OP TESTING. Standing Status:   Standing     Number of Occurrences:   1     Order Specific Question:   Has patient been transfused or pregnant in the last 3 mos. ? Answer:   Unknown       Provider Notes (Medical Decision Making):     MDM  Number of Diagnoses or Management Options  Anemia, unspecified type  DUB (dysfunctional uterine bleeding)  Diagnosis management comments:     32year old female with vaginal bleeding. Concerned about possible pregnancy and subsequent severe vaginal bleeding. Serum pregnancy test negative. CBC with anemia, Hgb 10.7 with no previous for comparison. Blood transfusion not indicated. Patient had initial calcium<5.0-likely lab error, CMP was repeated and CMP was within normal limits. Patient not having severe bleeding in the ED. Is hemodynamically stable and otherwise asymptomatic without dizziness or lightheadedness. She will follow up with GYN as an outpatient. Patient afebrile, nontoxic appearing, stable VS, tolerating PO. Reviewed care plan with patient. . Return precautions to ED discussed if symptoms worsen. Patient agreeable with plan of care. Amount and/or Complexity of Data Reviewed  Clinical lab tests: ordered and reviewed  Review and summarize past medical records: yes    Patient Progress  Patient progress: stable           Disposition   Disposition: DC- Adult Discharges: All of the diagnostic tests were reviewed and questions answered. Diagnosis, care plan and treatment options were discussed. The patient understands the instructions and will follow up as directed. The patients results have been reviewed with them. They have been counseled regarding their diagnosis.   The patient verbally convey understanding and agreement of the signs, symptoms, diagnosis, treatment and prognosis and additionally agrees to follow up as recommended with their PCP in 24 - 48 hours. They also agree with the care-plan and convey that all of their questions have been answered. I have also put together some discharge instructions for them that include: 1) educational information regarding their diagnosis, 2) how to care for their diagnosis at home, as well a 3) list of reasons why they would want to return to the ED prior to their follow-up appointment, should their condition change. DISCHARGE PLAN:  1. Current Discharge Medication List      CONTINUE these medications which have NOT CHANGED    Details   ARIPiprazole (ABILIFY) 2 mg tablet       cyclobenzaprine (FLEXERIL) 10 mg tablet Take 1 at bedtime x 1 week; then 1 BID  Qty: 60 Tablet, Refills: 2    Associated Diagnoses: Cervicogenic headache; Bilateral occipital neuralgia      cloNIDine HCL (CATAPRES) 0.2 mg tablet Take 0.2 mg by mouth daily. DULoxetine (Cymbalta) 60 mg capsule Take 60 mg by mouth daily. aspirin-acetaminophen-caffeine (EXCEDRIN ES) 250-250-65 mg per tablet Take 1 Tablet by mouth. 2.   Follow-up Information     Follow up With Specialties Details Why Contact Info    Follow up with your GYN  Schedule an appointment as soon as possible for a visit       [address] [facility] EMERGENCY DEPT Emergency Medicine  As needed, If symptoms worsen [address] [id number] [phone]        3. Return to ED if worse   4. Discharge Medication List as of 1/22/2022  2:22 PM            Diagnosis     Clinical Impression:   1. DUB (dysfunctional uterine bleeding)    2. Anemia, unspecified type        Attestations:    REANNA Rice    Please note that this dictation was completed with [employer], the [employer] voice recognition software.   Quite often unanticipated grammatical, syntax, homophones, and other interpretive errors are inadvertently transcribed by the computer software. Please disregard these errors. Please excuse any errors that have escaped final proofreading. Thank you.

## 2022-05-03 ENCOUNTER — OFFICE VISIT (OUTPATIENT)
Dept: NEUROLOGY | Age: 32
End: 2022-05-03
Payer: COMMERCIAL

## 2022-05-03 VITALS
SYSTOLIC BLOOD PRESSURE: 104 MMHG | DIASTOLIC BLOOD PRESSURE: 60 MMHG | RESPIRATION RATE: 18 BRPM | OXYGEN SATURATION: 97 % | HEART RATE: 71 BPM

## 2022-05-03 DIAGNOSIS — M54.81 BILATERAL OCCIPITAL NEURALGIA: ICD-10-CM

## 2022-05-03 DIAGNOSIS — E23.6 PITUITARY CYST (HCC): Primary | ICD-10-CM

## 2022-05-03 DIAGNOSIS — M54.2 NECK PAIN ON LEFT SIDE: ICD-10-CM

## 2022-05-03 DIAGNOSIS — M79.602 LEFT ARM PAIN: ICD-10-CM

## 2022-05-03 DIAGNOSIS — G44.86 CERVICOGENIC HEADACHE: ICD-10-CM

## 2022-05-03 PROCEDURE — 99214 OFFICE O/P EST MOD 30 MIN: CPT | Performed by: PSYCHIATRY & NEUROLOGY

## 2022-05-03 RX ORDER — BUSPIRONE HYDROCHLORIDE 30 MG/1
TABLET ORAL
COMMUNITY

## 2022-05-03 RX ORDER — METHYLPREDNISOLONE 4 MG/1
TABLET ORAL
Qty: 1 DOSE PACK | Refills: 0 | Status: SHIPPED | OUTPATIENT
Start: 2022-05-03

## 2022-05-03 NOTE — LETTER
5/4/2022    Patient: Mary Jane Beaver   YOB: 1990   Date of Visit: [facility], [address] [place] [address] Drive [address]  Via Fax: [phone]    Dear Anshul Dominguez MD,      Thank you for referring Ms. Brenden Negrete to [facility] for evaluation. My notes for this consultation are attached. If you have questions, please do not hesitate to call me. I look forward to following your patient along with you.       Sincerely,    Loni Soulier., MD

## 2022-05-03 NOTE — PROGRESS NOTES
HA are more often about 1-3 times a week lasting a couple hours   When turns neck to the right pains shoot down arm on left side   Having more memory issues  Would like to check on the cyst

## 2022-05-03 NOTE — PROGRESS NOTES
NEUROLOGY CLINIC NOTE    Patient ID:  Pricila Garcia  [id number]  67 y.o.  1990    Date of Visit:  May 3, 2022    Referring Physician: Dr. Alondra Matthews    Reason for Visit:  Headaches    Chief Complaint   Patient presents with    Follow-up       History of Present Illness: There are no problems to display for this patient. Past Medical History:   Diagnosis Date    Anxiety     Asthma     Depression     PTSD (post-traumatic stress disorder)      Past Surgical History:   Procedure Laterality Date    HX TUBAL LIGATION           Prior to Admission medications    Medication Sig Start Date End Date Taking? Authorizing Provider   busPIRone (BUSPAR) 30 mg tablet 1 tab(s)   Yes Provider, Historical   ARIPiprazole (ABILIFY) 2 mg tablet  9/24/21  Yes Provider, Historical   cyclobenzaprine (FLEXERIL) 10 mg tablet Take 1 at bedtime x 1 week; then 1 BID 10/19/21  Yes Tyler Bansal MD   DULoxetine (Cymbalta) 60 mg capsule Take 60 mg by mouth daily. Yes Provider, Historical   aspirin-acetaminophen-caffeine (EXCEDRIN ES) 250-250-65 mg per tablet Take 1 Tablet by mouth. Yes Provider, Historical   cloNIDine HCL (CATAPRES) 0.2 mg tablet Take 0.2 mg by mouth daily. Patient not taking: Reported on 5/3/2022    Provider, Historical     Social History     Tobacco Use    Smoking status: Never Smoker    Smokeless tobacco: Current User   Vaping Use    Vaping Use: Not on file   Substance Use Topics    Alcohol use: Yes     Alcohol/week: 2.0 standard drinks     Types: 2 Glasses of wine per week    Drug use: Never     Family History   Problem Relation Age of Onset    COPD Mother     Arthritis Mother          Subjective:      Pricila Garcia is a 32 y.o. RHWF anxiety, depression and PTSD who was referred here by Dr. Alondra Matthews for further evaluation of MRI findings of a brain cyst and headaches. Patient is here for follow up.     Headache for the past couple months  1 to 3 weeks with a headache and then another 3 weeks without  Sudden onset  Bitemporal and radiates to the back of the head  Throbbing  7-8/10 at its worst.  Associated with light sensitivity. Lasts for 1 hour. Excedrin sometimes it helps  No known precipitant. No worsening factors. Rest offers benefit. Last headache was this morning. Stay at home. Review of available records revealed:  Patient was seen by Dr. Sabi Tam, endocrinologist for hyperprolactinemia. Note mentions patient having issues with menorrhagia and galactorrhea. Prolactin level was noted to be elevated at 90 with normal TSH. Pregnancy test was negative. Menorrhagia since 10/20/2020. Also noted several months of galactorrhea. Patient was on risperidone 0.5 mg every day for 2 years for depression, anxiety and insomnia. Being followed by Dr. Wade Borrego (psychiatrist). Labs done 6/2/2021 revealed elevated prolactin at 90 with normal LH, FSH, TSH and T4. Impression then was possibly risperidone related side effects versus problems with pituitary function. Advised to discuss with psychiatrist in changing risperidone to alternative medication and a brain MRI focusing on the pituitary was ordered. Additional laboratory work-up was ordered. Labs done 6/24/2021 revealed normal FSH, LH, CMP, TSH, free T4, cortisol, estradiol, IGF and elevated prolactin at 85.7. Brain MRI with and without contrast of the pituitary done 6/24/2021 on postcontrast study reveals a bandlike signal of hypoenhancement along the posterior aspect of the pituitary gland. 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 Discrete focus of hypoenhancement in the rightward aspect of the pituitary gland communicates with a pars intermedia and may represent a focal dilation of pars intermedius/pars intermedia cyst, less likely adenoma. No secondary features of mass-effect. Mild cerebellar tonsillar ectopia approximately 4 mm. There is no abnormal brain parenchymal or leptomeningeal enhancement. Impression finding more related to pars intermedia/small cystic dilation and less likely a pituitary adenoma. Otherwise normal exam.    Patient was seen on follow-up 7/10/2021. Risperidone was discontinued by the patient for the past 2 weeks and she has lost 4 pounds since last appointment. Patient was referred to Dr. Rachele Garnica neurosurgery for pituitary lesion/cyst.  Repeat prolactin level testing was ordered. Per patient that has since improved. Review of records from [facility] revealed patient was seen by her primary care physician 5/12/2021. Patient was complaining of low blood pressure, menorrhagia and galactorrhea. Advised to monitor blood pressure at home. Complaining of neck and back pain and x-rays were ordered. Continue as needed albuterol for asthma. Continue medication for anxiety and PTSD. Laboratory work-up was ordered. And referred to OB. Labs done revealed unremarkable CMP, negative pregnancy screen, unremarkable UA, CBC, estradiol, LH, hemoglobin A1c, TSH and FSH. Elevated prolactin level at 64. Lipid panel revealed increased triglycerides. Since the last visit on 10/19/2021, patient reports increasing headache frequency. 1-3 headaches per week 8/10 in intensity. Lasting 2 hours. Excedrin helps. Bitemporal, behind the eyes and occiput in location. Patient also reports for the past 2 days when she turns her head to the right she has shooting pain down her left neck shoulder and arm. Also notes having some memory issues.   Concerned about her pituitary cyst.    Outside reports reviewed: none    Review of Systems:    A comprehensive review of systems was performed:   Constitutional: positive for none  Eyes: positive for none  Ears, nose, mouth, throat, and face: positive for none  Respiratory: positive for none  Cardiovascular: positive for none  Gastrointestinal: positive for constipation   Genitourinary: positive for none  Integument/breast: positive for none  Hematologic/lymphatic: positive for none  Musculoskeletal: positive for none  Neurological: positive for headaches  Behavioral/Psych: positive for anxiety  Endocrine: positive for none  Allergic/Immunologic: positive for none      Objective:     Visit Vitals  /60 (BP 1 Location: Right arm, BP Patient Position: Sitting, BP Cuff Size: Adult)   Pulse 71   Resp 18   SpO2 97%       PHYSICAL EXAM:    NEUROLOGICAL EXAM:    Appearance: The patient is well developed, well nourished, provides a coherent history and is in no acute distress. Mental Status: Oriented to time, place and person. Fluent, no aphasia or dysarthria. Mood and affect appropriate. Cranial Nerves:   II - XII were intact. Motor:  5/5 strength in upper and lower proximal and distal muscles. Normal bulk and tone. No pronator drift. Reflexes:   Deep tendon reflexes were symmetrical.    Sensory:   Normal to cold, pinprick and vibration. Gait:  Normal gait. No Romberg. Tremor:   No tremor noted. Cerebellar:  Intact FTN/NALDO/HTS. Anterior head posture with shoulders rotated in      Assessment:   Pituitary cyst  Left arm pain  Left neck pain  Cervicogenic headaches  Occipital neuralgia    Plan:   Neurological examination is still nonfocal.  Repeat pituitary brain MRI with and without contrast was ordered to reassess given worsening headaches and memory issues. Previous brain MRI with and without contrast of the pituitary done 6/24/2021 on postcontrast study reveals a bandlike signal of hypoenhancement along the posterior aspect of the pituitary gland. 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 Discrete focus of hypoenhancement in the rightward aspect of the pituitary gland communicates with a pars intermedia and may represent a focal dilation of pars intermedius/pars intermedia cyst, less likely adenoma. No secondary features of mass-effect. Mild cerebellar tonsillar ectopia approximately 4 mm.   There is no abnormal brain parenchymal or leptomeningeal enhancement. Impression finding more related to pars intermedia/small cystic dilation and less likely a pituitary adenoma. Otherwise normal exam.  Previous pituitary laboratory work-up was unremarkable and prolactin levels back to normal reportedly. Patient having issues with left neck/arm pain for the past 2 days. Suspect nerve root irritation. Medrol Dosepak was prescribed. If condition persist for more than 3 weeks then we will proceed with EMG/NCS of left upper extremity. Patient may benefit from physical therapy. History exam reveals elements of cervicogenic headaches due to poor anterior head posture. Patient was encouraged to continue to correct her anterior head posture. Use headrest at home and while driving. Use wireless headsets. Use only 1 pillow at most when sleeping. Do not carry anything on the shoulder. Continue neck and shoulder exercises. Okay to take OTC medication for abortive therapy. If condition persist patient may benefit from referral to physical therapy for more aggressive manipulation and dry needling. Continue as needed Cyclobenzaprine. Exam also reveals elements of occipital neuralgia due to poor anterior head posture. Correction of head posture as above. Potential trial of medications for neuralgia if persist.  Potential occipital nerve blocks if condition worsens. All questions and concerns were answered. This note was created using voice recognition software. Despite editing, there may be syntax errors.

## 2025-08-20 ENCOUNTER — TRANSCRIBE ORDERS (OUTPATIENT)
Facility: HOSPITAL | Age: 35
End: 2025-08-20

## 2025-08-20 DIAGNOSIS — M47.812 CERVICAL SPONDYLOSIS: ICD-10-CM

## 2025-08-20 DIAGNOSIS — M54.2 CHRONIC NECK PAIN: Primary | ICD-10-CM

## 2025-08-20 DIAGNOSIS — G89.29 CHRONIC NECK PAIN: Primary | ICD-10-CM
